# Patient Record
Sex: FEMALE | Race: WHITE
[De-identification: names, ages, dates, MRNs, and addresses within clinical notes are randomized per-mention and may not be internally consistent; named-entity substitution may affect disease eponyms.]

---

## 2018-08-12 ENCOUNTER — HOSPITAL ENCOUNTER (EMERGENCY)
Dept: HOSPITAL 58 - ED | Age: 63
Discharge: HOME | End: 2018-08-12

## 2018-08-12 VITALS — DIASTOLIC BLOOD PRESSURE: 85 MMHG | TEMPERATURE: 98.5 F | SYSTOLIC BLOOD PRESSURE: 138 MMHG

## 2018-08-12 VITALS — BODY MASS INDEX: 31.4 KG/M2

## 2018-08-12 DIAGNOSIS — S69.91XA: Primary | ICD-10-CM
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
## 2018-08-12 DIAGNOSIS — F17.210: ICD-10-CM

## 2018-08-12 PROCEDURE — 99282 EMERGENCY DEPT VISIT SF MDM: CPT

## 2018-08-12 NOTE — ED.PDOC
General


ED Provider: 


Dr. MELISSA ORDAZ-ER





Chief Complaint: Extremity Pain/Injury


Stated Complaint: i fell on my wrist this am--its hurting still


Time Seen by Physician: 19:00


Mode of Arrival: Walk-In


Information Source: Patient


Exam Limitations: No limitations


Nursing and Triage Documentation Reviewed and Agree: Yes


Does patient meet sepsis criteria?: No


System Inflammatory Response Syndrome: Not Applicable


Sepsis Protocol: 


For patient's 13 years and over:





Temp is 96.8 and below  and greater


Pulse >90 BPM


Resp >20/minute


Acutely Altered Mental Status





Are patient's symptoms suggestive of a new infection, such as:


   -Pneumonia


   -Skin, Soft Tissue


   -Endocarditis


   -UTI


   -Bone, Joint Infection


   -Implantable Device


   -Acute Abdominal Infection


   -Wound Infection


   -Meningitis


   -Blood Stream Catheter Infection


   -Unknown








Musculoskeletal Complaint Exam





- Hand/Wrist Complaint/Exam


Location of Pain: Reports: Right, Wrist


Mechanism of Injury: Reports: Trauma


Onset/Duration: 12 hrs


Symptoms Are: Still present


Onset of Pain: Reports: Immediate


Initial Severity: Mild


Current Severity: Moderate


Location: Reports: Discrete (right wrist)


Character: Reports: Dull, Aching, Throbbing, Spasmodic, Stiffness


Aggravating: Reports: Movement


Associated Signs and Symptoms: Denies: Swelling, Redness, Bruising, Fever, 

Weakness, Numbness, Tingling


Hand/Wrist Findings: Present: Swelling


Tenderness: Present: Radius, Ulna


Compartment Syndrome Risk Factors: Present: Pain


Differential Diagnoses: Contusion, Closed Fracture





Review of Systems





- Review Of Systems


Constitutional: Reports: No symptoms


Eyes: Reports: No symptoms


Ears, Nose, Mouth, Throat: Reports: No symptoms


Respiratory: Reports: No symptoms


Cardiac: Reports: No symptoms


GI: Reports: No symptoms


: Reports: No symptoms


Musculoskeletal: Reports: Joint pain, Joint swelling


Skin: Reports: No symptoms


Neurological: Reports: No symptoms


Endocrine: Reports: No symptoms


Hematologic/Lymphatic: Reports: No symptoms


All Other Systems: Reviewed and Negative





Past Medical History





- Past Medical History


Previously Healthy: No


Endocrine: Reports: None


Cardiovascular: Reports: None


Respiratory: Reports: None


Hematological: Reports: None


Gastrointestinal: Reports: PUD


Genitourinary: Reports: None


Neuro/Psych: Reports: None


Musculoskeletal: Reports: None


Cancer: Reports: None


Last Menstrual Period: 1988





- Surgical History


General Surgical History: Reports: Appendectomy





- Family History


Family History: Reports: None





- Social History


Smoking Status: Current every day smoker


Hx Substance Use: No


Alcohol Screening: None





- Immunizations


Tetanus Shot up to Date: Yes





Physical Exam





- Physical Exam


Appearance: Well-appearing, No pain distress, Well-nourished


Pain Distress: Moderate


Eyes: RIA


ENT: Ears normal, Nose normal, Oropharynx normal


Neck: Supple


Respiratory: Airway patent, Breath sounds clear, Breath sounds equal, 

Respirations nonlabored


Cardiovascular: RRR


GI/: Soft, Nontender, No masses, Bowel sounds normal, No Organomegaly


Musculoskeletal: Limited ROM


Skin: Warm, Dry, Normal color


Neurological: Sensation intact, Motor intact, Reflexes intact, Cranial nerves 

intact, Alert, Oriented


Psychiatric: Affect appropriate, Mood appropriate





Interpretation





- Radiology Interpretation


Radiology Interpretation By: ED Physician


Radiology Results: Positive





Procedures





- Splinting


Location: right wrist


Hand-Made Type: Orthoglass


Splint: Wrist


Pre-Proc Neuro Vasc Exam: Normal


Post-Proc Neuro Vasc Exam: Normal


Progress: 





the splint was applied by nursing staff





Critical Care Note





- Critical Care Note


Total Time (mins): 0





Course





- Course


Orders, Labs, Meds: 


Orders











 Category Date Time Status


 


 WRIST, RIGHT 3 VIEWS Stat RADS  08/12/18 18:47 Taken











Vital Signs: 


 











  Temp Pulse Resp BP Pulse Ox


 


 08/12/18 18:36  98.5 F  100 H  20  138/85  96














Departure





- Departure


Time of Disposition: 19:08


Disposition: HOME SELF-CARE


Discharge Problem: 


Injury, wrist


Qualifiers:


 Encounter type: initial encounter Laterality: right Qualified Code(s): 

S69.91XA - Unspecified injury of right wrist, hand and finger(s), initial 

encounter





Instructions:  Wrist Fracture in Adults (ED)


Condition: Good


Pt referred to PMD for follow-up: Yes


IPMP verified?: No


Additional Instructions: 


norco 7.5mg q 4hrs prn pain #10--stay in splint--keep elevated to keep swelling 

down--see dr cristobal tomorrow for ortho referral


Allergies/Adverse Reactions: 


Allergies





No Known Allergies Allergy (Unverified 08/12/18 18:44)


 








Home Medications: 


Ambulatory Orders





1 [No Reported Medications]  08/12/18 








Disposition Discussed With: Patient, Family

## 2018-08-13 NOTE — DI
EXAM:  Radiographs, right wrist 

  

HISTORY:  Initial presentation for right wrist injury. 

  

COMPARISON:  None available. 

  

TECHNIQUE:  Three views. 

  

  

FINDINGS:  Bone mineralization is decreased.  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
 radial ulnar joint and first CMC joint with more mild changes elsewhere.  Question no old fracture d
eformity of the base of the fifth metacarpal.  No acute fracture or dislocation detected.  No localiz
ed soft tissue abnormality is seen. 

  

------------------------------ 

IMPRESSION: 

No acute fracture or dislocation.